# Patient Record
Sex: FEMALE | Race: WHITE
[De-identification: names, ages, dates, MRNs, and addresses within clinical notes are randomized per-mention and may not be internally consistent; named-entity substitution may affect disease eponyms.]

---

## 2018-02-18 ENCOUNTER — HOSPITAL ENCOUNTER (EMERGENCY)
Dept: HOSPITAL 31 - C.ER | Age: 23
Discharge: ANOTHER HOSPITAL | End: 2018-02-18
Payer: COMMERCIAL

## 2018-02-18 VITALS — SYSTOLIC BLOOD PRESSURE: 126 MMHG | RESPIRATION RATE: 20 BRPM | DIASTOLIC BLOOD PRESSURE: 73 MMHG | HEART RATE: 84 BPM

## 2018-02-18 VITALS — TEMPERATURE: 97.8 F

## 2018-02-18 VITALS — OXYGEN SATURATION: 100 %

## 2018-02-18 DIAGNOSIS — M48.56XA: ICD-10-CM

## 2018-02-18 DIAGNOSIS — M48.54XA: Primary | ICD-10-CM

## 2018-02-18 LAB
ALBUMIN SERPL-MCNC: 4.4 G/DL (ref 3.5–5)
ALBUMIN/GLOB SERPL: 1.4 {RATIO} (ref 1–2.1)
ALT SERPL-CCNC: 37 U/L (ref 9–52)
APTT BLD: 27 SECONDS (ref 21–34)
AST SERPL-CCNC: 37 U/L (ref 14–36)
BASOPHILS # BLD AUTO: 0 K/UL (ref 0–0.2)
BASOPHILS NFR BLD: 0.3 % (ref 0–2)
BILIRUB UR-MCNC: NEGATIVE MG/DL
BUN SERPL-MCNC: 7 MG/DL (ref 7–17)
CALCIUM SERPL-MCNC: 10 MG/DL (ref 8.6–10.4)
EOSINOPHIL # BLD AUTO: 0 K/UL (ref 0–0.7)
EOSINOPHIL NFR BLD: 0.1 % (ref 0–4)
ERYTHROCYTE [DISTWIDTH] IN BLOOD BY AUTOMATED COUNT: 13.7 % (ref 11.5–14.5)
GFR NON-AFRICAN AMERICAN: > 60
GLUCOSE UR STRIP-MCNC: NORMAL MG/DL
HCG,QUALITATIVE URINE: NEGATIVE
HGB BLD-MCNC: 13.5 G/DL (ref 11–16)
INR PPP: 1.2
LEUKOCYTE ESTERASE UR-ACNC: (no result) LEU/UL
LYMPHOCYTE: 6 % (ref 20–40)
LYMPHOCYTES # BLD AUTO: 0.9 K/UL (ref 1–4.3)
LYMPHOCYTES NFR BLD AUTO: 6.4 % (ref 20–40)
MCH RBC QN AUTO: 28.5 PG (ref 27–31)
MCHC RBC AUTO-ENTMCNC: 34.2 G/DL (ref 33–37)
MCV RBC AUTO: 83.3 FL (ref 81–99)
MONOCYTE: 2 % (ref 0–10)
MONOCYTES # BLD: 0.4 K/UL (ref 0–0.8)
MONOCYTES NFR BLD: 2.5 % (ref 0–10)
NEUTROPHILS # BLD: 13.1 K/UL (ref 1.8–7)
NEUTROPHILS NFR BLD AUTO: 90.7 % (ref 50–75)
NEUTROPHILS NFR BLD AUTO: 91 % (ref 50–75)
NEUTS BAND NFR BLD: 1 % (ref 0–2)
NRBC BLD AUTO-RTO: 0 % (ref 0–2)
PH UR STRIP: 8 [PH] (ref 5–8)
PLATELET # BLD EST: NORMAL 10*3/UL
PLATELET # BLD: 302 K/UL (ref 130–400)
PMV BLD AUTO: 8.4 FL (ref 7.2–11.7)
PROT UR STRIP-MCNC: NEGATIVE MG/DL
PROTHROMBIN TIME: 13 SECONDS (ref 9.7–12.2)
RBC # BLD AUTO: 4.72 MIL/UL (ref 3.8–5.2)
RBC # UR STRIP: (no result) /UL
RBC MORPH BLD: NORMAL
SP GR UR STRIP: 1.02 (ref 1–1.03)
TOTAL CELLS COUNTED BLD: 100
URINE NITRATE: NEGATIVE
UROBILINOGEN UR-MCNC: NORMAL MG/DL (ref 0.2–1)
WBC # BLD AUTO: 14.5 K/UL (ref 4.8–10.8)

## 2018-02-18 PROCEDURE — 96374 THER/PROPH/DIAG INJ IV PUSH: CPT

## 2018-02-18 PROCEDURE — 86900 BLOOD TYPING SEROLOGIC ABO: CPT

## 2018-02-18 PROCEDURE — 96376 TX/PRO/DX INJ SAME DRUG ADON: CPT

## 2018-02-18 PROCEDURE — 96361 HYDRATE IV INFUSION ADD-ON: CPT

## 2018-02-18 PROCEDURE — 80053 COMPREHEN METABOLIC PANEL: CPT

## 2018-02-18 PROCEDURE — 85610 PROTHROMBIN TIME: CPT

## 2018-02-18 PROCEDURE — 99285 EMERGENCY DEPT VISIT HI MDM: CPT

## 2018-02-18 PROCEDURE — 85025 COMPLETE CBC W/AUTO DIFF WBC: CPT

## 2018-02-18 PROCEDURE — 93005 ELECTROCARDIOGRAM TRACING: CPT

## 2018-02-18 PROCEDURE — 81001 URINALYSIS AUTO W/SCOPE: CPT

## 2018-02-18 PROCEDURE — 72131 CT LUMBAR SPINE W/O DYE: CPT

## 2018-02-18 PROCEDURE — 85730 THROMBOPLASTIN TIME PARTIAL: CPT

## 2018-02-18 PROCEDURE — 96375 TX/PRO/DX INJ NEW DRUG ADDON: CPT

## 2018-02-18 PROCEDURE — 71045 X-RAY EXAM CHEST 1 VIEW: CPT

## 2018-02-18 PROCEDURE — 86850 RBC ANTIBODY SCREEN: CPT

## 2018-02-18 PROCEDURE — 84703 CHORIONIC GONADOTROPIN ASSAY: CPT

## 2018-02-18 NOTE — CT
PROCEDURE:  CT Lumbar Spine without contrast



HISTORY:

fall



COMPARISON:

None.



TECHNIQUE:

Axial computed tomography images were obtained of the lumbar spine 

without the use of intravenous contrast. Coronal and sagittal 

reformatted images were created and reviewed. 



Radiation dose:



Total exam DLP = 342.56 mGy-cm.



This CT exam was performed using one or more of the following dose 

reduction techniques: Automated exposure control, adjustment of the 

mA and/or kV according to patient size, and/or use of iterative 

reconstruction technique.



FINDINGS:



VERTEBRAE:

There is age indeterminate moderate to severe compression fracture at 

T12 vertebral body associated with bony retropulsion and moderate to 

mildly severe narrowing of the spinal canal at this level. The 

possibility of conus medullaris compression is not totally excluded.



There is also age indeterminate moderate compression deformity of L3 

vertebral body involving the superior endplate. Moderate diffuse 

osteopenia/osteoporosis is noted. 



Mild age indeterminate compression deformity at the body and 

posterior aspect of L4 and L5 is also noted. 



DISCS/SPINAL CANAL/NEURAL FORAMINA:

L1-2:    No evidence of significant stenosis.



L2-3:    No evidence of significant stenosis.



L3-4:    Small disc bulge seen without evidence of significant spinal 

or neural foraminal narrowing.



L4-5:    Small broad-based disc protrusion associated with mild facet 

joint hypertrophy which resulting in mild spinal stenosis.



L5-S1:  Small broad-based disc bulge seen without evidence of 

significant spinal or neural foraminal narrowing.



PARASPINAL SOFT TISSUES:

Unremarkable. 



OTHER FINDINGS:

None. 



IMPRESSION:

Age indeterminate severe compression deformity of T12 involving the 

body and superior endplate and associated with bony retropulsion and 

moderate to mildly severe narrowing of the spinal canal at this 

level. The possibility of cord/ conus medullaris compression should 

be considered.



Moderate compression deformity of L3 vertebral body without evidence 

of significant spinal canal stenosis at this level.



Moderate to mildly severe osteoporosis.



Age indeterminate mild compression deformity of L4 and L5.

## 2018-02-18 NOTE — C.PDOC
History Of Present Illness


22-year-old female, presents to the emergency department with complaints of 

back pain. Patient states she sustained an injury to her lower back s/p slip 

and fall prior to arrival. Patient notes she fell backward, onto her butt. 

Denies numbness/weakness, change in bowel habits, bladder/bowel incontinence, 

loss of consciousness, head injury, neck pain, or any other associated 

symptoms. No other complaints at this time. 


Time Seen by Provider: 18 08:26


Chief Complaint (Nursing): Back Pain


History Per: Patient


History/Exam Limitations: no limitations


Onset/Duration Of Symptoms: Other (Prior to arrival.)





Past Medical History


Reviewed: Historical Data, Nursing Documentation, Vital Signs


Vital Signs: 


 Last Vital Signs











Temp  97.8 F   18 08:20


 


Pulse  91 H  18 11:40


 


Resp  21   18 11:40


 


BP  129/72   18 11:40


 


Pulse Ox  100   18 12:31











Family History: States: No Known Family Hx





- Social History


Hx Alcohol Use: No


Hx Substance Use: No





- Immunization History


Hx Tetanus Toxoid Vaccination: No


Hx Influenza Vaccination: No


Hx Pneumococcal Vaccination: No





Review Of Systems


Cardiovascular: Negative for: Chest Pain, Light Headedness


Gastrointestinal: Negative for: Nausea, Vomiting


Musculoskeletal: Positive for: Back Pain.  Negative for: Neck Pain


Neurological: Negative for: Weakness, Numbness, Incoordination, Headache, 

Dizziness





Physical Exam





- Physical Exam


Appears: Non-toxic, No Acute Distress (Moderate Distress)


Skin: Normal Color, Warm, Dry, No Rash


Head: Atraumatic, Normacephalic


Eye(s): bilateral: Normal Inspection, PERRL


Nose: Normal


Oral Mucosa: Moist


Neck: Normal ROM, No Midline Cervical Tenderness, No Paracervical Tenderness, 

No Step Off Deformity


Chest: Symmetrical


Cardiovascular: Rhythm Regular, No Murmur


Respiratory: Normal Breath Sounds, No Accessory Muscle Use (equal sounds B/L)


Gastrointestinal/Abdominal: Soft, No Tenderness


Back: Decreased ROM, Paraspinal Tenderness (Diffuse.)


Extremity: Normal ROM, No Deformity, No Swelling


Neurological/Psych: Oriented x3, Normal Speech, Other (No focal deficit)





ED Course And Treatment





- Laboratory Results


Result Diagrams: 


 18 11:40





 18 11:40


ECG: Interpreted By Me, Viewed By Me


ECG Rhythm: Sinus Rhythm


ECG Interpretation: No Acute Changes


Rate From EC


O2 Sat by Pulse Oximetry: 100 (RA)


Pulse Ox Interpretation: Normal





- CT Scan/US


  ** CT Spine


Other Rad Studies (CT/US): Read By Radiologist, Radiology Report Reviewed


CT/US Interpretation: Accession No. : [id number].  Patient Name / ID : 

AN SIEGEL  / [id number].  Exam Date : 2018 10:10:34 ( Approved )

.  Study Comment :  Sex / Age : F  / 22Y.  Creator : Laina Dominguez MD.  

Dictator : Laina Dominguez MD.   :  Approver : Laina Dominguez MD.  Approver2 :  Report Date : 2018 10:32:49.  My Comment :  ****

*******************************************************************************

.  PROCEDURE:  CT Lumbar Spine without contrast.  HISTORY:  fall.  COMPARISON:  

None.  TECHNIQUE:  Axial computed tomography images were obtained of the lumbar 

spine without the use of intravenous contrast. Coronal and sagittal reformatted 

images were created and reviewed.  Radiation dose:  Total exam DLP = 342.56 mGy-

cm.  This CT exam was performed using one or more of the following dose 

reduction techniques: Automated exposure control, adjustment of the mA and/or 

kV according to patient size, and/or use of iterative reconstruction technique.

  FINDINGS:  VERTEBRAE:  There is age indeterminate moderate to severe 

compression fracture at T12 vertebral body associated with bony retropulsion 

and moderate to mildly severe narrowing of the spinal canal at this level. The 

possibility of conus medullaris compression is not totally excluded.  There is 

also age indeterminate moderate compression deformity of L3 vertebral body 

involving the superior endplate. Moderate diffuse osteopenia/osteoporosis is 

noted.  Mild age indeterminate compression deformity at the body and posterior 

aspect of L4 and L5 is also noted.  DISCS/SPINAL CANAL/NEURAL FORAMINA:  L1-2: 

   No evidence of significant stenosis.  L2-3:    No evidence of significant 

stenosis.  L3-4:    Small disc bulge seen without evidence of significant 

spinal or neural foraminal narrowing.  L4-5:    Small broad-based disc 

protrusion associated with mild facet joint hypertrophy which resulting in mild 

spinal stenosis.  L5-S1:  Small broad-based disc bulge seen without evidence of 

significant spinal or neural foraminal narrowing.  PARASPINAL SOFT TISSUES:  

Unremarkable.  OTHER FINDINGS:  None.  IMPRESSION:  Age indeterminate severe 

compression deformity of T12 involving the body and superior endplate and 

associated with bony retropulsion and moderate to mildly severe narrowing of 

the spinal canal at this level. The possibility of cord/ conus medullaris 

compression should be considered.  Moderate compression deformity of L3 

vertebral body without evidence of significant spinal canal stenosis at this 

level.  Moderate to mildly severe osteoporosis.  Age indeterminate mild 

compression deformity of L4 and L5.





Medical Decision Making


Medical Decision Making: 


Impression


23y/o F comes in w/ back pain s/p mechanical slip and fall. Due to severity of 

symptoms, CT will be ordered instead of XR.





Plan:


* Type and Screen


* CT Lumbar Spine


* EKG


* CBC, PTT, PT


* Chest X-Ray


* Dilaudid, Percocet, IVFs


* UA/HCG


* Reassess and Disposition





Patients CT scan is positive for multiple fractures. Case was discussed [facility] 

and patient will be transferred to , admitting Dr Ricco Santamaria. Patient 

agreeable with plan. All questions answered. 





Disposition





- Disposition


Disposition: Trans to Other Acute Care Hosp


Disposition Time: 12:57


Condition: FAIR


Forms:  [facility] (English)





- Clinical Impression


Clinical Impression: 


 T12 compression fracture, Compression fracture of L3 lumbar vertebra, 

Compression fracture of L4 lumbar vertebra, Compression fracture of L5 lumbar 

vertebra








- Scribe Statement


The provider has reviewed the documentation as recorded by the Scribe (Tyrese Tellez)


All medical record entries made by the Scribe were at my direction and 

personally dictated by me. I have reviewed the chart and agree that the record 

accurately reflects my personal performance of the history, physical exam, 

medical decision making, and the department course for this patient. I have 

also personally directed, reviewed, and agree with the discharge instructions 

and disposition.

## 2018-02-18 NOTE — RAD
PROCEDURE:  CHEST RADIOGRAPH, 1 VIEW



HISTORY:

Pre Op



COMPARISON:

None available.



FINDINGS:



LUNGS:

No evidence of focal infiltrate or consolidation in the lungs



PLEURA:

No pneumothorax or pleural fluid seen.



CARDIOVASCULAR:

Normal.



OSSEOUS STRUCTURES:

No significant abnormalities.



VISUALIZED UPPER ABDOMEN:

Normal.



OTHER FINDINGS:

None. 



IMPRESSION:

No active disease.

## 2018-02-19 NOTE — CARD
--------------- APPROVED REPORT --------------





EKG Measurement

Heart [id number]

OH [id number]

[id number]

[id number]

[id number]



<Conclusion>

Normal sinus rhythm

Normal ECG